# Patient Record
Sex: MALE | Race: WHITE | NOT HISPANIC OR LATINO | URBAN - METROPOLITAN AREA
[De-identification: names, ages, dates, MRNs, and addresses within clinical notes are randomized per-mention and may not be internally consistent; named-entity substitution may affect disease eponyms.]

---

## 2022-02-12 ENCOUNTER — OFFICE VISIT (OUTPATIENT)
Dept: URGENT CARE | Facility: CLINIC | Age: 52
End: 2022-02-12
Payer: COMMERCIAL

## 2022-02-12 VITALS
HEIGHT: 70 IN | SYSTOLIC BLOOD PRESSURE: 120 MMHG | BODY MASS INDEX: 30.06 KG/M2 | RESPIRATION RATE: 20 BRPM | TEMPERATURE: 98 F | WEIGHT: 210 LBS | HEART RATE: 88 BPM | OXYGEN SATURATION: 95 % | DIASTOLIC BLOOD PRESSURE: 84 MMHG

## 2022-02-12 DIAGNOSIS — R10.9 ABDOMINAL PAIN, UNSPECIFIED ABDOMINAL LOCATION: Primary | ICD-10-CM

## 2022-02-12 LAB
BILIRUB UR QL STRIP: NEGATIVE
GLUCOSE UR QL STRIP: NEGATIVE
KETONES UR QL STRIP: NEGATIVE
LEUKOCYTE ESTERASE UR QL STRIP: NEGATIVE
PH, POC UA: 5.5 (ref 5–8)
POC BLOOD, URINE: NEGATIVE
POC NITRATES, URINE: NEGATIVE
PROT UR QL STRIP: NEGATIVE
SP GR UR STRIP: 1.02 (ref 1–1.03)
UROBILINOGEN UR STRIP-ACNC: NORMAL (ref 0.3–2.2)

## 2022-02-12 PROCEDURE — 81003 URINALYSIS AUTO W/O SCOPE: CPT | Mod: QW,S$GLB,, | Performed by: NURSE PRACTITIONER

## 2022-02-12 PROCEDURE — 1159F PR MEDICATION LIST DOCUMENTED IN MEDICAL RECORD: ICD-10-PCS | Mod: CPTII,S$GLB,, | Performed by: NURSE PRACTITIONER

## 2022-02-12 PROCEDURE — 99204 PR OFFICE/OUTPT VISIT, NEW, LEVL IV, 45-59 MIN: ICD-10-PCS | Mod: S$GLB,,, | Performed by: NURSE PRACTITIONER

## 2022-02-12 PROCEDURE — 99204 OFFICE O/P NEW MOD 45 MIN: CPT | Mod: S$GLB,,, | Performed by: NURSE PRACTITIONER

## 2022-02-12 PROCEDURE — 3074F SYST BP LT 130 MM HG: CPT | Mod: CPTII,S$GLB,, | Performed by: NURSE PRACTITIONER

## 2022-02-12 PROCEDURE — 3079F DIAST BP 80-89 MM HG: CPT | Mod: CPTII,S$GLB,, | Performed by: NURSE PRACTITIONER

## 2022-02-12 PROCEDURE — 74019 RADEX ABDOMEN 2 VIEWS: CPT | Mod: S$GLB,,, | Performed by: RADIOLOGY

## 2022-02-12 PROCEDURE — 1159F MED LIST DOCD IN RCRD: CPT | Mod: CPTII,S$GLB,, | Performed by: NURSE PRACTITIONER

## 2022-02-12 PROCEDURE — 3008F BODY MASS INDEX DOCD: CPT | Mod: CPTII,S$GLB,, | Performed by: NURSE PRACTITIONER

## 2022-02-12 PROCEDURE — 3008F PR BODY MASS INDEX (BMI) DOCUMENTED: ICD-10-PCS | Mod: CPTII,S$GLB,, | Performed by: NURSE PRACTITIONER

## 2022-02-12 PROCEDURE — 74019 XR ABDOMEN FLAT AND ERECT: ICD-10-PCS | Mod: S$GLB,,, | Performed by: RADIOLOGY

## 2022-02-12 PROCEDURE — 3074F PR MOST RECENT SYSTOLIC BLOOD PRESSURE < 130 MM HG: ICD-10-PCS | Mod: CPTII,S$GLB,, | Performed by: NURSE PRACTITIONER

## 2022-02-12 PROCEDURE — 81003 POCT URINALYSIS, DIPSTICK, AUTOMATED, W/O SCOPE: ICD-10-PCS | Mod: QW,S$GLB,, | Performed by: NURSE PRACTITIONER

## 2022-02-12 PROCEDURE — 3079F PR MOST RECENT DIASTOLIC BLOOD PRESSURE 80-89 MM HG: ICD-10-PCS | Mod: CPTII,S$GLB,, | Performed by: NURSE PRACTITIONER

## 2022-02-12 RX ORDER — CIPROFLOXACIN 500 MG/1
500 TABLET ORAL 2 TIMES DAILY
Qty: 20 TABLET | Refills: 0 | Status: SHIPPED | OUTPATIENT
Start: 2022-02-12 | End: 2022-02-22

## 2022-02-12 RX ORDER — METRONIDAZOLE 500 MG/1
500 TABLET ORAL 3 TIMES DAILY
Qty: 30 TABLET | Refills: 0 | Status: SHIPPED | OUTPATIENT
Start: 2022-02-12 | End: 2022-02-22

## 2022-02-12 NOTE — PROGRESS NOTES
Subjective:       Patient ID: Dejan Cid is a 51 y.o. male.    Vitals:  vitals were not taken for this visit.     Chief Complaint: Abdominal Pain    Abdominal Pain  This is a new problem. The current episode started in the past 7 days (X3 DAYS). The onset quality is sudden. The problem occurs constantly. The problem has been waxing and waning. The pain is located in the periumbilical region. The pain is at a severity of 7/10. The pain is moderate. The quality of the pain is burning. The abdominal pain does not radiate. Associated symptoms include constipation and nausea. Pertinent negatives include no belching, diarrhea, dysuria, fever, flatus, headaches or vomiting. Nothing aggravates the pain. The pain is relieved by nothing. He has tried nothing for the symptoms. The treatment provided no relief.   had colonoscopy one year ago for removal of polyps, was treated in recent past about 3 years ago for presumed diverticulitis, never had ct done, no diarrhea, last bm this evening soft brown. No n/v    Constitution: Negative for activity change, appetite change, chills, fatigue and fever.   HENT: Negative for ear pain, ear discharge, facial swelling, congestion, postnasal drip, sinus pressure and trouble swallowing.    Cardiovascular: Negative for chest pain, leg swelling, palpitations and sob on exertion.   Respiratory: Negative for chest tightness, cough, shortness of breath and wheezing.    Gastrointestinal: Positive for abdominal pain, nausea and constipation. Negative for vomiting and diarrhea.   Genitourinary: Negative for dysuria.   Neurological: Negative for dizziness, light-headedness, speech difficulty, loss of balance, headaches, disorientation and altered mental status.   Psychiatric/Behavioral: Negative for altered mental status, disorientation, confusion, agitation and nervous/anxious. The patient is not nervous/anxious.        Objective:      Physical Exam   Constitutional: He is oriented to person,  place, and time. He appears well-developed and well-nourished.   HENT:   Head: Normocephalic and atraumatic.   Ears:   Right Ear: External ear normal.   Left Ear: External ear normal.   Nose: Nose normal.   Mouth/Throat: Mucous membranes are normal.   Eyes: Conjunctivae and lids are normal.   Neck: Trachea normal. Neck supple.   Cardiovascular: Normal rate, regular rhythm and normal heart sounds.   Pulmonary/Chest: Effort normal and breath sounds normal. No respiratory distress.   Abdominal: Normal appearance and bowel sounds are normal. He exhibits no distension, no abdominal bruit, no pulsatile midline mass and no mass. Soft. There is no abdominal tenderness.      Comments: Pain with deep palpation in LLQ   Musculoskeletal: Normal range of motion.         General: No edema. Normal range of motion.   Neurological: He is alert and oriented to person, place, and time. He has normal strength.   Skin: Skin is warm, dry, intact, not diaphoretic and not pale.   Psychiatric: He has a normal mood and affect. His speech is normal and behavior is normal. Judgment and thought content normal. Cognition and memory  Nursing note and vitals reviewed.    X-Ray Abdomen Flat And Erect    Result Date: 2/12/2022  EXAMINATION: XR ABDOMEN FLAT AND ERECT CLINICAL HISTORY: Unspecified abdominal pain TECHNIQUE: Flat and erect AP views of the abdomen were performed. COMPARISON: None FINDINGS: No definite dilated loops of small or large bowel are appreciated.  No definite free air.  Equivocal nonspecific air-fluid level within loop of small bowel on the upright view.  Moderate to large volume colonic stool. No acute findings are suggested in the visualized portions of the thorax.  Scoliotic degenerative findings of the spine.     No definite dilated loops of small or large bowel appreciated.  Equivocal nonspecific air-fluid level within of small bowel on the upright view.  Correlation for signs of infectious or inflammatory enteritis,  small-bowel obstruction or ileus would be recommended. Electronically signed by: Prashanth Gloria Date:    02/12/2022 Time:    17:55          Assessment:       1. Abdominal pain, unspecified abdominal location      diverticulitis    Plan:         Abdominal pain, unspecified abdominal location    clear liquid diet while having pain, once pain improved may increase diet to full liquid diet and then to low fiber diet       Dejan was seen today for abdominal pain.    Diagnoses and all orders for this visit:    Abdominal pain, unspecified abdominal location  -     POCT Urinalysis, Dipstick, Automated, W/O Scope  -     X-Ray Abdomen Flat And Erect; Future  -     Ambulatory referral/consult to Colorectal Surgery    Other orders  -     ciprofloxacin HCl (CIPRO) 500 MG tablet; Take 1 tablet (500 mg total) by mouth 2 (two) times daily. for 10 days  -     metroNIDAZOLE (FLAGYL) 500 MG tablet; Take 1 tablet (500 mg total) by mouth 3 (three) times daily. Absolutely no alcohol with this medication. for 10 days        Education  Pt has been given instructions populated from [employer] database and those entered into patient instructions field and has verbalized understanding of the after visit summary and information contained wherein.    Follow Up  Fu with crs, er precautions given.    In Case of Emergency   May go to ER for acute shortness of breath, lightheadedness, fever, or any other emergent complaints or changes in condition.

## 2022-02-12 NOTE — PATIENT INSTRUCTIONS
"Patient Education       Diverticulitis   The Basics   Written by the doctors and editors at [facility]   What is diverticulitis? -- Diverticulitis is a disorder that can cause belly pain, fever, and problems with bowel movements.  The food we eat travels from the stomach through a long tube called the intestine. The last part of that tube is the colon (figure 1). The colon sometimes has small pouches in its walls. These pouches are called "diverticula." Many people who have these pouches have no symptoms. Diverticulitis happens when these pouches develop a small tear also known as a "microperforation," which then become infected and cause symptoms.  What are the symptoms of diverticulitis? -- The most common symptom of diverticulitis is pain, which is usually in the lower part of the belly. Other symptoms can include:  · Fever  · Constipation  · Diarrhea  · Nausea and vomiting  Is there a test for diverticulitis? -- Yes. There are a few tests your doctor can do to find out if you have diverticulitis. But tests are not always needed. If you do have a test, you might have a:  · CT scan - A CT scan is a kind of imaging test. Imaging tests create pictures of the inside of your body.  · Abdominal ultrasound - This test uses sound waves to create pictures of your intestines.  How is diverticulitis treated? -- Diverticulitis is typically treated with antibiotics. You might also need to go on a clear liquid diet for a short time. If you only have mild symptoms, this might be all the treatment you need.   But if you have severe symptoms, or if you get a fever, you might need to stay in the hospital. There, you can get fluids and antibiotics through a thin tube that goes into your vein, called an "IV." That way you can stop eating and drinking until you get better.  If you have a serious infection, the doctor might put a tube into your belly to drain the infection. In very bad cases, people need surgery to remove the part of " "the colon that is affected.  A few months after your infection has been treated, your doctor might recommend that you have a procedure called a colonoscopy (figure 2). During a colonoscopy, the doctor can look directly inside your colon to get an idea of the number of diverticula in your colon and to find out where they are. At the same time, they can check for signs of cancer.  Should I change my diet if I have had diverticulitis? -- If you have had diverticulitis, it's a good idea to eat a lot of fiber. Good sources of fiber include fruits, oats, beans, peas, and green leafy vegetables. If you do not already eat fiber-rich foods, wait until after your symptoms get better to start.  You do not need to avoid seeds, nuts, popcorn, or other similar foods.  All topics are updated as new evidence becomes available and our peer review process is complete.  This topic retrieved from [employer] on: Sep 21, 2021.  Topic 63260 Version 10.0  Release: 29.4.2 - C29.263  © 2021 UpToDate, Inc. and/or its affiliates. All rights reserved.  figure 1: Digestive system     This drawing shows the organs in the body that process food. Together these organs are called "the digestive system," or "digestive tract." As food travels through this system, the body absorbs nutrients and water.  Graphic 65559 Version 4.0    figure 2: Colonoscopy     During a colonoscopy, you lie on your side and the doctor puts a thin tube with a camera into your anus (from behind). Then the doctor advances the tube into the rectum and colon. The camera sends pictures from inside your colon to a television screen.  Graphic 66326 Version 6.0    Consumer Information Use and Disclaimer   This information is not specific medical advice and does not replace information you receive from your health care provider. This is only a brief summary of general information. It does NOT include all information about conditions, illnesses, injuries, tests, procedures, treatments, " therapies, discharge instructions or life-style choices that may apply to you. You must talk with your health care provider for complete information about your health and treatment options. This information should not be used to decide whether or not to accept your health care provider's advice, instructions or recommendations. Only your health care provider has the knowledge and training to provide advice that is right for you. The use of this information is governed by the "[employer] End User License Agreement, available at [email or web address]://[email or web address]/harriet.The use of [employer] content is governed by the [employer] Terms of Use. ©2021 UpToDate, Inc. All rights reserved.  Copyright   © 2021 UpToDate, Inc. and/or its affiliates. All rights reserved.  Patient Education       Diverticulitis Discharge Instructions   About this topic   Sometimes, you may get small pouches or pockets in the walls of your large bowel. This is called diverticulosis. The pouches may become inflamed or infected. This is called diverticulitis. You are more likely to have this problem if you are between 60 and 80 years of age or if you have chronic constipation.     What care is needed at home?   · Ask your doctor what you need to do when you go home. Make sure you ask questions if you do not understand what the doctor says. This way you will know what you need to do.  · Take your drugs as ordered by your doctor.  · Eat a balanced diet.  · Do not delay having a bowel movement. Go as soon as you have the urge.  · Do not strain or force your bowel movements.  · Drink 8 to 10 glasses of water each day. Talk to your doctor if you are drinking less fluids due to a health problem.  What follow-up care is needed?   Your doctor may ask you to make visits to the office to check on your progress. Be sure to keep these visits.  What drugs may be needed?   The doctor may order drugs to:  · Help with pain and swelling  · Fight an  infection  · Soften stools to help prevent straining with bowel movements  Will physical activity be limited?   When you are in pain, you may need to rest in bed. To ease the pain, use a heat compress on your belly. This should last only for a few days.  What changes to diet are needed?   Talk to your doctor about any changes you need to make to your diet. When the pouches become inflamed or infected, you may need to take in just liquids for a few days. This may help lessen your pain and help you heal.  When you are feeling better, start to add more fiber to your diet.  · You do not need to avoid seeds, nuts, corn, or other similar foods.  · You will need to eat food rich in fiber and drink more water.  ? Eat 5 or more servings of fresh fruits and vegetables every day.  ? Eat 6 or more servings of whole-wheat grain breads and cereals.  ? Try to get 25 to 30 grams of fiber every day. Read the labels to learn how much fiber is in foods.  ? Do not drink beer, wine, and mixed drinks (alcohol).  What problems could happen?   · Pockets or pouches in your bowel may be infected or filled with pus.  · Hole or tear in your bowel  · Narrowing of a part in your bowel  · Surgery may be needed to drain an infection or abscess  · If untreated, the colon may rupture and surgery may be needed.  What can be done to prevent this health problem?   The best way to keep from having diverticulosis is to keep your bowel movements soft and normal. To keep more pouches from forming:  · Eat more whole grains, vegetables, and fruits. Try to get 25 to 30 grams of fiber each day. Read the labels to learn how much fiber is in foods.  · Drink more water. Drink ten 8 ounce (240 mL) glasses a day.  · Be active. Walk, garden, or do something active for 30 minutes or more on most days of the week.  · Talk with your doctor about adding an over-the-counter (OTC) fiber product to keep your stools soft.  · Overuse of some pain drugs can cause hard stools;  talk with your doctor.  · If you have a lot of pouches in your large intestine, surgery may be right for you. Talk to your doctor about this.  When do I need to call the doctor?   · Signs of infection. These include a fever of 100.4°F (38°C) or higher and chills.  · Upset stomach or very bad belly pain.  · Vomiting or being unable to eat, drink, or take your medications.  · Extreme fatigue, lightheadedness, dizziness, or passing out.  · Diarrhea or blood in your stool.  · Not having bowel movements or not passing any gas.  Teach Back: Helping You Understand   The Teach Back Method helps you understand the information we are giving you. After you talk with the staff, tell them in your own words what you learned. This helps to make sure the staff has described each thing clearly. It also helps to explain things that may have been confusing. Before going home, make sure you can do these:  · I can tell you about my condition.  · I can tell you what changes I need to make with my diet or drugs.  · I can tell you what I will do if I have very bad belly pain or hard or bloody stools.  Where can I learn more?   American Academy of Family Physicians  https://[email or web address]/condition/[email or web address]-[email or web address]   International Foundation for Functional Gastrointestinal Disorders  https://www.iffgd.org/other-disorders/diverticulosis-and-diverticulitis.html   Last Reviewed Date   2021-04-13  Consumer Information Use and Disclaimer   This information is not specific medical advice and does not replace information you receive from your health care provider. This is only a brief summary of general information. It does NOT include all information about conditions, illnesses, injuries, tests, procedures, treatments, therapies, discharge instructions or life-style choices that may apply to you. You must talk with your health care provider for complete information about your health and treatment options. This information should not be  used to decide whether or not to accept your health care providers advice, instructions or recommendations. Only your health care provider has the knowledge and training to provide advice that is right for you.  Copyright   Copyright © 2021 UpToDate, Inc. and its affiliates and/or licensors. All rights reserved.

## 2023-02-01 ENCOUNTER — OFFICE VISIT (OUTPATIENT)
Dept: URGENT CARE | Facility: CLINIC | Age: 53
End: 2023-02-01
Payer: COMMERCIAL

## 2023-02-01 VITALS
TEMPERATURE: 98 F | DIASTOLIC BLOOD PRESSURE: 72 MMHG | WEIGHT: 210 LBS | SYSTOLIC BLOOD PRESSURE: 109 MMHG | BODY MASS INDEX: 30.06 KG/M2 | HEART RATE: 83 BPM | RESPIRATION RATE: 16 BRPM | OXYGEN SATURATION: 95 % | HEIGHT: 70 IN

## 2023-02-01 DIAGNOSIS — M54.50 BILATERAL LOW BACK PAIN WITHOUT SCIATICA, UNSPECIFIED CHRONICITY: ICD-10-CM

## 2023-02-01 DIAGNOSIS — R10.84 GENERALIZED ABDOMINAL PAIN: Primary | ICD-10-CM

## 2023-02-01 PROCEDURE — 99204 PR OFFICE/OUTPT VISIT, NEW, LEVL IV, 45-59 MIN: ICD-10-PCS | Mod: S$GLB,,, | Performed by: NURSE PRACTITIONER

## 2023-02-01 PROCEDURE — 3074F SYST BP LT 130 MM HG: CPT | Mod: CPTII,S$GLB,, | Performed by: NURSE PRACTITIONER

## 2023-02-01 PROCEDURE — 1159F PR MEDICATION LIST DOCUMENTED IN MEDICAL RECORD: ICD-10-PCS | Mod: CPTII,S$GLB,, | Performed by: NURSE PRACTITIONER

## 2023-02-01 PROCEDURE — 3078F DIAST BP <80 MM HG: CPT | Mod: CPTII,S$GLB,, | Performed by: NURSE PRACTITIONER

## 2023-02-01 PROCEDURE — 3078F PR MOST RECENT DIASTOLIC BLOOD PRESSURE < 80 MM HG: ICD-10-PCS | Mod: CPTII,S$GLB,, | Performed by: NURSE PRACTITIONER

## 2023-02-01 PROCEDURE — 1159F MED LIST DOCD IN RCRD: CPT | Mod: CPTII,S$GLB,, | Performed by: NURSE PRACTITIONER

## 2023-02-01 PROCEDURE — 3008F BODY MASS INDEX DOCD: CPT | Mod: CPTII,S$GLB,, | Performed by: NURSE PRACTITIONER

## 2023-02-01 PROCEDURE — 3074F PR MOST RECENT SYSTOLIC BLOOD PRESSURE < 130 MM HG: ICD-10-PCS | Mod: CPTII,S$GLB,, | Performed by: NURSE PRACTITIONER

## 2023-02-01 PROCEDURE — 99204 OFFICE O/P NEW MOD 45 MIN: CPT | Mod: S$GLB,,, | Performed by: NURSE PRACTITIONER

## 2023-02-01 PROCEDURE — 3008F PR BODY MASS INDEX (BMI) DOCUMENTED: ICD-10-PCS | Mod: CPTII,S$GLB,, | Performed by: NURSE PRACTITIONER

## 2023-02-01 RX ORDER — DICYCLOMINE HYDROCHLORIDE 20 MG/1
20 TABLET ORAL EVERY 6 HOURS
Qty: 60 TABLET | Refills: 0 | Status: SHIPPED | OUTPATIENT
Start: 2023-02-01 | End: 2023-02-16

## 2023-02-01 NOTE — PATIENT INSTRUCTIONS
Take the Bentyl as prescribed for abdominal cramping  Decrease or stop dairy  Perform back exercises  Apply heat as needed for pain  Follow up with GI and PCP

## 2023-02-01 NOTE — PROGRESS NOTES
"Subjective:       Patient ID: Dejan Cid is a 52 y.o. male.    Vitals:  height is 5' 10" (1.778 m) and weight is 95.3 kg (210 lb). His temperature is 98.4 °F (36.9 °C). His blood pressure is 109/72 and his pulse is 83. His respiration is 16 and oxygen saturation is 95%.     Chief Complaint: Abdominal Pain    51 yo male with h/o diverticulitis reports generalized abdominal pain, described as aching/burning, for past month. States that he changed his diet about 2 years ago, vegetarian with increased dairy. Since the diet change, reports loose stools with intermittent urgency. Reports last colonoscopy about a year ago, no abnormal findings. Increased discomfort with pressure and sitting up.  Also reports lower back pain over past 1-2 months. States that his job is more sedentary now, sitting more than he used to. Aching back pain treated with ibuprofen.     Abdominal Pain  This is a new problem. The current episode started more than 1 month ago. The onset quality is gradual. The problem occurs constantly. The problem has been waxing and waning. The pain is located in the generalized abdominal region. The quality of the pain is aching and burning. The abdominal pain does not radiate. Associated symptoms include belching. Pertinent negatives include no constipation, dysuria, fever, frequency, hematochezia, hematuria, nausea or vomiting. Associated symptoms comments: Lower back pain. The pain is aggravated by palpation (coffee). Relieved by: hot showers. The treatment provided mild relief. His past medical history is significant for GERD. There is no history of irritable bowel syndrome. Patient's medical history does not include kidney stones and UTI.     History reviewed. No pertinent past medical history.  History reviewed. No pertinent surgical history.      Constitution: Negative for chills, fatigue and fever.   Gastrointestinal:  Positive for abdominal pain. Negative for nausea, vomiting, constipation, bright red " blood in stool, dark colored stools, rectal pain and bowel incontinence.   Genitourinary:  Negative for dysuria, frequency, urgency, flank pain and hematuria.   Musculoskeletal:  Negative for back pain.   Neurological:  Negative for numbness and tingling.     Objective:      Physical Exam   Constitutional: He does not appear ill. No distress.   Cardiovascular: Normal rate, regular rhythm, normal heart sounds and normal pulses.   Pulmonary/Chest: Effort normal and breath sounds normal.   Abdominal: Normal appearance and bowel sounds are normal. He exhibits no distension. Soft. There is generalized abdominal tenderness. There is no rebound, no guarding, negative Mo's sign, no left CVA tenderness and no right CVA tenderness.   Musculoskeletal:      Lumbar back: He exhibits normal range of motion, no tenderness and no bony tenderness.   Neurological: no focal deficit. He is alert. He displays no weakness. No sensory deficit. Gait normal.   Vitals reviewed.      Assessment:       1. Generalized abdominal pain    2. Bilateral low back pain without sciatica, unspecified chronicity          Plan:       Generalized abdominal pain  -     dicyclomine (BENTYL) 20 mg tablet; Take 1 tablet (20 mg total) by mouth every 6 (six) hours. for 15 days  Dispense: 60 tablet; Refill: 0  -     Ambulatory referral/consult to Gastroenterology  Decrease or eliminate dairy from diet. Food diaries can help eliminate triggering foods.   Please follow up with GI for further evaluation and treatment.    Bilateral low back pain without sciatica, unspecified chronicity  May continue ibuprofen as directed for pain. Perform back stretches. Apply heat for 15-20 minutes 2-3 times a day.     ER precautions discussed.